# Patient Record
Sex: FEMALE | Race: WHITE | NOT HISPANIC OR LATINO | Employment: FULL TIME | URBAN - METROPOLITAN AREA
[De-identification: names, ages, dates, MRNs, and addresses within clinical notes are randomized per-mention and may not be internally consistent; named-entity substitution may affect disease eponyms.]

---

## 2017-01-09 ENCOUNTER — ALLSCRIPTS OFFICE VISIT (OUTPATIENT)
Dept: OTHER | Facility: OTHER | Age: 27
End: 2017-01-09

## 2017-02-12 ENCOUNTER — ALLSCRIPTS OFFICE VISIT (OUTPATIENT)
Dept: OTHER | Facility: OTHER | Age: 27
End: 2017-02-12

## 2017-04-04 ENCOUNTER — GENERIC CONVERSION - ENCOUNTER (OUTPATIENT)
Dept: OTHER | Facility: OTHER | Age: 27
End: 2017-04-04

## 2017-04-04 ENCOUNTER — TRANSCRIBE ORDERS (OUTPATIENT)
Dept: ADMINISTRATIVE | Facility: HOSPITAL | Age: 27
End: 2017-04-04

## 2017-04-04 ENCOUNTER — ALLSCRIPTS OFFICE VISIT (OUTPATIENT)
Dept: OTHER | Facility: OTHER | Age: 27
End: 2017-04-04

## 2017-04-04 DIAGNOSIS — R01.1 UNDIAGNOSED CARDIAC MURMURS: Primary | ICD-10-CM

## 2017-04-05 ENCOUNTER — HOSPITAL ENCOUNTER (OUTPATIENT)
Dept: NON INVASIVE DIAGNOSTICS | Facility: HOSPITAL | Age: 27
Discharge: HOME | End: 2017-04-05
Attending: FAMILY MEDICINE
Payer: COMMERCIAL

## 2017-04-05 DIAGNOSIS — R01.1 UNDIAGNOSED CARDIAC MURMURS: ICD-10-CM

## 2017-04-05 PROCEDURE — 93306 TTE W/DOPPLER COMPLETE: CPT

## 2017-04-06 ENCOUNTER — GENERIC CONVERSION - ENCOUNTER (OUTPATIENT)
Dept: OTHER | Facility: OTHER | Age: 27
End: 2017-04-06

## 2017-04-07 ENCOUNTER — GENERIC CONVERSION - ENCOUNTER (OUTPATIENT)
Dept: OTHER | Facility: OTHER | Age: 27
End: 2017-04-07

## 2017-04-07 LAB
ADEQUACY: (HISTORICAL): NORMAL
CLINICIAN PROVIDIED ICD 9 OR 10 (HISTORICAL): NORMAL
COMMENT (HISTORICAL): NORMAL
DIAGNOSIS (HISTORICAL): NORMAL
NOTE: (HISTORICAL): NORMAL
PERFORMED BY (HISTORICAL): NORMAL
REFLEX (HISTORICAL): NORMAL

## 2017-04-24 ENCOUNTER — ALLSCRIPTS OFFICE VISIT (OUTPATIENT)
Dept: OTHER | Facility: OTHER | Age: 27
End: 2017-04-24

## 2017-06-18 ENCOUNTER — ALLSCRIPTS OFFICE VISIT (OUTPATIENT)
Dept: OTHER | Facility: OTHER | Age: 27
End: 2017-06-18

## 2017-06-20 ENCOUNTER — APPOINTMENT (OUTPATIENT)
Dept: RADIOLOGY | Facility: CLINIC | Age: 27
End: 2017-06-20
Payer: COMMERCIAL

## 2017-06-20 ENCOUNTER — ALLSCRIPTS OFFICE VISIT (OUTPATIENT)
Dept: OTHER | Facility: OTHER | Age: 27
End: 2017-06-20

## 2017-06-20 ENCOUNTER — GENERIC CONVERSION - ENCOUNTER (OUTPATIENT)
Dept: OTHER | Facility: OTHER | Age: 27
End: 2017-06-20

## 2017-06-20 ENCOUNTER — TRANSCRIBE ORDERS (OUTPATIENT)
Dept: RADIOLOGY | Facility: CLINIC | Age: 27
End: 2017-06-20

## 2017-06-20 DIAGNOSIS — M25.552 PAIN IN LEFT HIP: ICD-10-CM

## 2017-06-20 PROCEDURE — 73502 X-RAY EXAM HIP UNI 2-3 VIEWS: CPT

## 2017-10-05 ENCOUNTER — ALLSCRIPTS OFFICE VISIT (OUTPATIENT)
Dept: OTHER | Facility: OTHER | Age: 27
End: 2017-10-05

## 2018-01-10 NOTE — RESULT NOTES
Verified Results  * XR HIP/PELV 2-3 VWS LEFT W PELVIS IF PERFORMED 20Jun2017 12:00PM Jesus Lawson Order Number: [id number]     Test Name Result Flag Reference   * XR HIP/PELV 2-3 VWS LEFT (Report)     LEFT HIP     INDICATION: Left hip pain  COMPARISON: None     VIEWS: AP pelvis and 2 coned down views     IMAGES: 3     FINDINGS:     There is no fracture or dislocation  No degenerative changes  No lytic or blastic lesions are seen  Soft tissues are unremarkable  IMPRESSION:     No acute osseous abnormality         Workstation performed: [id number]     Signed by:   Aleja Valenzuela MD   6/20/17

## 2018-01-10 NOTE — RESULT NOTES
Verified Results  ([facility] [facility]) Pap IG, rfx HPV ASCU [id number] 12:00AM [employer]     Test Name Result Flag Reference   DIAGNOSIS: Comment     NEGATIVE FOR INTRAEPITHELIAL LESION AND MALIGNANCY  THE CYTOLOGY PROCESSING WAS PERFORMED AT THE [facility] LOCATED AT  [facility], [address] [address]  Specimen adequacy: Comment     Satisfactory for evaluation  Endocervical and/or squamous metaplastic  cells (endocervical component) are present  Clinician provided ICD10: Comment     Z01 419   Performed by: Radha Manuel, Cytotechnologist (ASCP)     [facility] Note: Comment     The Pap smear is a screening test designed to aid in the detection of  premalignant and malignant conditions of the uterine cervix  It is not a  diagnostic procedure and should not be used as the sole means of detecting  cervical cancer  Both false-positive and false-negative reports do occur    Comment     The HPV DNA reflex criteria were not met with this specimen result  therefore, no HPV testing was performed         Discussion/Summary   Your PAP test came back normal  Wily Mata

## 2018-01-11 NOTE — MISCELLANEOUS
Message  Return to work or school:   [employer] is under my professional care  She was seen in my office on 10/05/2017        please excuse from work of 10/05 and 10/06        Signatures   Electronically signed by : CARIN Fenton ; Oct  5 2017 10:06AM EST                       (Author)

## 2018-01-12 VITALS
DIASTOLIC BLOOD PRESSURE: 80 MMHG | WEIGHT: 131 LBS | TEMPERATURE: 97.8 F | HEIGHT: 62 IN | HEART RATE: 84 BPM | RESPIRATION RATE: 16 BRPM | SYSTOLIC BLOOD PRESSURE: 120 MMHG | BODY MASS INDEX: 24.11 KG/M2

## 2018-01-12 VITALS
BODY MASS INDEX: 23.55 KG/M2 | HEART RATE: 84 BPM | HEIGHT: 62 IN | RESPIRATION RATE: 16 BRPM | WEIGHT: 128 LBS | TEMPERATURE: 98.4 F | DIASTOLIC BLOOD PRESSURE: 70 MMHG | OXYGEN SATURATION: 99 % | SYSTOLIC BLOOD PRESSURE: 122 MMHG

## 2018-01-13 VITALS
HEART RATE: 84 BPM | RESPIRATION RATE: 18 BRPM | DIASTOLIC BLOOD PRESSURE: 80 MMHG | HEIGHT: 62 IN | TEMPERATURE: 97.8 F | WEIGHT: 130.38 LBS | BODY MASS INDEX: 23.99 KG/M2 | SYSTOLIC BLOOD PRESSURE: 120 MMHG

## 2018-01-13 VITALS
HEART RATE: 95 BPM | HEIGHT: 62 IN | DIASTOLIC BLOOD PRESSURE: 66 MMHG | TEMPERATURE: 98.7 F | OXYGEN SATURATION: 98 % | RESPIRATION RATE: 18 BRPM | SYSTOLIC BLOOD PRESSURE: 118 MMHG | BODY MASS INDEX: 24.11 KG/M2 | WEIGHT: 131 LBS

## 2018-01-13 VITALS
RESPIRATION RATE: 18 BRPM | HEART RATE: 87 BPM | SYSTOLIC BLOOD PRESSURE: 122 MMHG | TEMPERATURE: 97.6 F | OXYGEN SATURATION: 99 % | DIASTOLIC BLOOD PRESSURE: 72 MMHG

## 2018-01-13 NOTE — RESULT NOTES
Verified Results  ECHO COMPLETE WITH CONTRAST IF INDICATED 2017 02:40PM Melissa Augustin     Test Name Result Flag Reference   ECHO COMPLETE WITH CONTRAST IF INDICATED (Report)     [address]   [address] [facility]   [address]   [phone]     Transthoracic Echocardiogram   2D, M-mode, Doppler, and Color Doppler     Study date: 2017     Patient: Rochelle Lee   MR number: [id number]   Account number: [de-identified]   : 1990   Age: 28 years   Gender: Female   Status: Routine   Location: Echo lab   Height: 62 in   Weight: 130 7 lb   BP: 120/ 80 mmHg     Indications: Murmur     Diagnoses: 785 2 - CARDIAC MURMURS NEC     Sonographer: Josemanuel Kapadia   Primary Physician: Yanna Liu MD   Referring Physician: Yanna Liu MD   Group: Adolfo Tabares   Interpreting Physician: Raven Dye DO     IMPRESSIONS:   The study was within normal limits  HISTORY: PRIOR HISTORY: Patient has no history of cardiovascular disease  PROCEDURE: The procedure was performed in the echo lab  This was a routine study  The transthoracic approach was used  The study included complete 2D imaging, M-mode, complete spectral Doppler, and color Doppler  The heart rate was 100   bpm, at the start of the study  Image quality was adequate  LEFT VENTRICLE: Size was normal  Systolic function was normal by visual assessment  Ejection fraction was estimated in the range of 55 % to 60 %  There were no regional wall motion abnormalities  Wall thickness was normal  No evidence of   apical thrombus  DOPPLER: Left ventricular diastolic function parameters were normal      RIGHT VENTRICLE: The size was normal  Systolic function was normal  Wall thickness was normal      LEFT ATRIUM: Size was normal      RIGHT ATRIUM: Size was normal      MITRAL VALVE: Valve structure was normal  There was normal leaflet separation   DOPPLER: The transmitral velocity was within the normal range  There was no evidence for stenosis  There was no significant regurgitation  AORTIC VALVE: The valve was trileaflet  Leaflets exhibited normal thickness and normal cuspal separation  DOPPLER: Transaortic velocity was within the normal range  There was no evidence for stenosis  There was no significant   regurgitation  TRICUSPID VALVE: The valve structure was normal  There was normal leaflet separation  DOPPLER: The transtricuspid velocity was within the normal range  There was no evidence for stenosis  There was no significant regurgitation  PULMONIC VALVE: Leaflets exhibited normal thickness, no calcification, and normal cuspal separation  DOPPLER: The transpulmonic velocity was within the normal range  There was no significant regurgitation  PERICARDIUM: There was no pericardial effusion  The pericardium was normal in appearance  AORTA: The root exhibited normal size  SYSTEMIC VEINS: IVC: The inferior vena cava was normal in size  SYSTEM MEASUREMENT TABLES     2D mode   AoR Diam 2D: 2 9 cm   LA Diam (2D): 2 9 cm   LA/Ao (2D): 1   FS (2D Teich): 27 %   IVSd (2D): 0 86 cm   LVDEV: 89 6 cmï¾³   LVESV: 42 2 cmï¾³   LVIDd(2D): 4 44 cm   LVISd (2D): 3 24 cm   LVPWd (2D): 0 96 cm   SV (Teich): 47 4 cmï¾³     Apical four chamber   LVEF A4C: 60 %     Unspecified Scan Mode   MV Peak A Hasmukh: 734 mm/s   MV Peak E Hasmukh   Mean: 929 mm/s   MVA (PHT): 4 68 cm squared   PHT: 47 ms   RA Area: 11 cm squared   RA Volume: 19 7 cmï¾³   TAPSE: 2 1 cm     [employer] Accredited Echocardiography Laboratory     Prepared and electronically signed by     Ana Luisa Priest DO   Signed 06-Apr-2017 08:55:53

## 2018-01-14 VITALS
RESPIRATION RATE: 18 BRPM | TEMPERATURE: 98.3 F | WEIGHT: 132 LBS | HEIGHT: 61 IN | HEART RATE: 85 BPM | OXYGEN SATURATION: 97 % | SYSTOLIC BLOOD PRESSURE: 131 MMHG | DIASTOLIC BLOOD PRESSURE: 83 MMHG | BODY MASS INDEX: 24.92 KG/M2

## 2018-02-12 ENCOUNTER — OFFICE VISIT (OUTPATIENT)
Dept: URGENT CARE | Facility: CLINIC | Age: 28
End: 2018-02-12
Payer: COMMERCIAL

## 2018-02-12 VITALS
DIASTOLIC BLOOD PRESSURE: 70 MMHG | OXYGEN SATURATION: 97 % | BODY MASS INDEX: 25.43 KG/M2 | WEIGHT: 138.2 LBS | HEART RATE: 93 BPM | HEIGHT: 62 IN | SYSTOLIC BLOOD PRESSURE: 100 MMHG | TEMPERATURE: 98.1 F

## 2018-02-12 DIAGNOSIS — J11.1 INFLUENZA: Primary | ICD-10-CM

## 2018-02-12 PROCEDURE — 99213 OFFICE O/P EST LOW 20 MIN: CPT | Performed by: PHYSICIAN ASSISTANT

## 2018-02-12 RX ORDER — NORGESTIMATE AND ETHINYL ESTRADIOL 0.25-0.035
KIT ORAL
COMMUNITY
End: 2019-01-23 | Stop reason: ALTCHOICE

## 2018-02-12 RX ORDER — NORGESTIMATE AND ETHINYL ESTRADIOL 0.25-0.035
1 KIT ORAL DAILY
COMMUNITY
End: 2019-01-23 | Stop reason: ALTCHOICE

## 2018-02-12 RX ORDER — OSELTAMIVIR PHOSPHATE 75 MG/1
75 CAPSULE ORAL 2 TIMES DAILY
Qty: 10 CAPSULE | Refills: 0 | Status: SHIPPED | OUTPATIENT
Start: 2018-02-12 | End: 2018-02-17

## 2018-02-12 RX ORDER — ALBUTEROL SULFATE 90 UG/1
AEROSOL, METERED RESPIRATORY (INHALATION)
COMMUNITY
Start: 2017-10-05

## 2018-02-12 RX ORDER — BENZONATATE 200 MG/1
1 CAPSULE ORAL 3 TIMES DAILY PRN
COMMUNITY
Start: 2017-10-05 | End: 2019-01-23 | Stop reason: ALTCHOICE

## 2018-02-12 NOTE — LETTER
February 12, 2018     Patient: Rosalinda Velasco   YOB: 1990   Date of Visit: 2/12/2018       To Whom It May Concern: It is my medical opinion that Barbara Carrasquillo may return to work on 2/14/2018 pending that her symptoms have improved  If you have any questions or concerns, please don't hesitate to call           Sincerely,        Xavi Guerrero PA-C    CC: Rosalinda Velasco

## 2018-02-12 NOTE — PATIENT INSTRUCTIONS
Influenza   AMBULATORY CARE:   Influenza  (the flu) is an infection caused by the influenza virus  The flu is easily spread when an infected person coughs, sneezes, or has close contact with others  You may be able to spread the flu to others for 1 week or longer after signs or symptoms appear  Common signs and symptoms include the following:   · Fever and chills    · Headaches, body aches, and muscle or joint pain    · Cough, runny nose, and sore throat    · Loss of appetite, nausea, vomiting, or diarrhea    · Tiredness    · Trouble breathing  Call 911 for any of the following:   · You have trouble breathing, and your lips look purple or blue  · You have a seizure  Seek care immediately if:   · You are dizzy, or you are urinating less or not at all  · You have a headache with a stiff neck, and you feel tired or confused  · You have new pain or pressure in your chest     · Your symptoms, such as shortness of breath, vomiting, or diarrhea, get worse  · Your symptoms, such as fever and coughing, seem to get better, but then get worse  Contact your healthcare provider if:   · You have new muscle pain or weakness  · You have questions or concerns about your condition or care  Treatment for influenza  may include any of the following:  · Acetaminophen  decreases pain and fever  It is available without a doctor's order  Ask how much to take and how often to take it  Follow directions  Acetaminophen can cause liver damage if not taken correctly  · NSAIDs , such as ibuprofen, help decrease swelling, pain, and fever  This medicine is available with or without a doctor's order  NSAIDs can cause stomach bleeding or kidney problems in certain people  If you take blood thinner medicine, always ask your healthcare provider if NSAIDs are safe for you  Always read the medicine label and follow directions  · Antivirals  help fight a viral infection    Manage your symptoms:   · Rest  as much as you can to help you recover  · Drink liquids as directed  to help prevent dehydration  Ask how much liquid to drink each day and which liquids are best for you  Prevent the spread of the flu:   · Wash your hands often  Use soap and water  Wash your hands after you use the bathroom, change a child's diapers, or sneeze  Wash your hands before you prepare or eat food  Use gel hand cleanser when soap and water are not available  Do not touch your eyes, nose, or mouth unless you have washed your hands first            · Cover your mouth when you sneeze or cough  Cough into a tissue or the bend of your arm  · Clean shared items with a germ-killing   Clean table surfaces, doorknobs, and light switches  Do not share towels, silverware, and dishes with people who are sick  Wash bed sheets, towels, silverware, and dishes with soap and water  · Wear a mask  over your mouth and nose if you are sick or are near anyone who is sick  · Stay away from others  if you are sick  · Influenza vaccine  helps prevent influenza (flu)  Everyone older than 6 months should get a yearly influenza vaccine  Get the vaccine as soon as it is available, usually in September or October each year  Follow up with your healthcare provider as directed:  Write down your questions so you remember to ask them during your visits  © 2017 [facility] Information is for End User's use only and may not be sold, redistributed or otherwise used for commercial purposes  All illustrations and images included in CareNotes® are the copyrighted property of A D A M , Inc  or [address]  The above information is an  only  It is not intended as medical advice for individual conditions or treatments  Talk to your doctor, nurse or pharmacist before following any medical regimen to see if it is safe and effective for you  Influenza:   -The patients symptoms and hx are consistent with influenza   Will begin treatment with Tamiflu 75mg taken as directed  Side effects discussed     -Stay very well hydrated and rest   -You can take tylenol or advil for fever or pain   -If your symptoms worsen go to the ED

## 2018-02-12 NOTE — PROGRESS NOTES
Assessment/Plan:    Influenza:   -The patients symptoms and hx are consistent with influenza  Will begin treatment with Tamiflu 75mg taken as directed  Side effects discussed  -Stay very well hydrated and rest   -You can take tylenol or advil for fever or pain   -If your symptoms worsen go to the ED    No problem-specific Assessment & Plan notes found for this encounter  There are no diagnoses linked to this encounter  Subjective:      Patient ID: Jamel Bourne is a 33 y o  female  The patient presents today for a two day hx of fever, chills, myalgias and nausea  The patient reports that two days ago her symptoms started acutely with a fever of 103 degrees that has progressed and worsened to chills, dizziness, anorexia  This morning she has experienced severe myalgias  Her co-worker was diagnosed with flu on Thursday  She did not have her flu vaccine  She notes that she has a PMH of exercise induced asthma but is not currently on medications  Flu Symptoms   Associated symptoms include chills, fatigue, a fever, headaches and nausea  Pertinent negatives include no chest pain, congestion, coughing, sore throat or vomiting  The following portions of the patient's history were reviewed and updated as appropriate: allergies, current medications, past family history, past medical history, past social history, past surgical history and problem list     Review of Systems   Constitutional: Positive for appetite change, chills, fatigue and fever  HENT: Positive for rhinorrhea  Negative for congestion, ear discharge, ear pain, sinus pain, sinus pressure, sneezing and sore throat  Respiratory: Negative for cough, shortness of breath and wheezing  Cardiovascular: Negative for chest pain and palpitations  Gastrointestinal: Positive for nausea  Negative for constipation, diarrhea and vomiting  Neurological: Positive for dizziness, light-headedness and headaches  Objective:    Vitals:    02/12/18 0837   BP: 100/70   Pulse: 93   Temp: 98 1 °F (36 7 °C)   SpO2: 97%        Physical Exam   Constitutional: She is oriented to person, place, and time  She appears well-developed and well-nourished  HENT:   Nose: Nose normal    Mouth/Throat: Oropharynx is clear and moist  No oropharyngeal exudate  Neck: Normal range of motion  Cardiovascular: Normal rate, regular rhythm and normal heart sounds  Exam reveals no gallop and no friction rub  No murmur heard  Pulmonary/Chest: Effort normal and breath sounds normal  No respiratory distress  She has no wheezes  She has no rales  Abdominal: Soft  Bowel sounds are normal  She exhibits no distension  There is no tenderness  There is no rebound and no guarding  Lymphadenopathy:     She has no cervical adenopathy  Neurological: She is alert and oriented to person, place, and time  Psychiatric: She has a normal mood and affect

## 2018-03-16 DIAGNOSIS — Z30.9 ENCOUNTER FOR CONTRACEPTIVE MANAGEMENT, UNSPECIFIED TYPE: Primary | ICD-10-CM

## 2018-03-16 RX ORDER — NORGESTIMATE AND ETHINYL ESTRADIOL 0.25-0.035
KIT ORAL
Qty: 84 TABLET | Refills: 3 | Status: SHIPPED | OUTPATIENT
Start: 2018-03-16 | End: 2019-03-10 | Stop reason: SDUPTHER

## 2018-03-17 DIAGNOSIS — Z30.9 ENCOUNTER FOR CONTRACEPTIVE MANAGEMENT, UNSPECIFIED TYPE: Primary | ICD-10-CM

## 2018-03-17 RX ORDER — NORGESTIMATE AND ETHINYL ESTRADIOL 0.25-0.035
KIT ORAL
Qty: 84 TABLET | Refills: 3 | Status: SHIPPED | OUTPATIENT
Start: 2018-03-17 | End: 2019-01-23 | Stop reason: ALTCHOICE

## 2019-01-22 ENCOUNTER — APPOINTMENT (OUTPATIENT)
Dept: RADIOLOGY | Facility: CLINIC | Age: 29
End: 2019-01-22
Payer: COMMERCIAL

## 2019-01-22 ENCOUNTER — OFFICE VISIT (OUTPATIENT)
Dept: URGENT CARE | Facility: CLINIC | Age: 29
End: 2019-01-22
Payer: COMMERCIAL

## 2019-01-22 VITALS
HEART RATE: 87 BPM | BODY MASS INDEX: 27.94 KG/M2 | SYSTOLIC BLOOD PRESSURE: 112 MMHG | TEMPERATURE: 98.3 F | HEIGHT: 61 IN | DIASTOLIC BLOOD PRESSURE: 70 MMHG | WEIGHT: 148 LBS | OXYGEN SATURATION: 100 % | RESPIRATION RATE: 16 BRPM

## 2019-01-22 DIAGNOSIS — M54.2 CERVICALGIA: ICD-10-CM

## 2019-01-22 DIAGNOSIS — M79.602 LEFT ARM PAIN: Primary | ICD-10-CM

## 2019-01-22 DIAGNOSIS — M79.602 LEFT ARM PAIN: ICD-10-CM

## 2019-01-22 PROCEDURE — 72050 X-RAY EXAM NECK SPINE 4/5VWS: CPT

## 2019-01-22 PROCEDURE — 73030 X-RAY EXAM OF SHOULDER: CPT

## 2019-01-22 PROCEDURE — 99214 OFFICE O/P EST MOD 30 MIN: CPT | Performed by: PHYSICIAN ASSISTANT

## 2019-01-22 RX ORDER — METHYLPREDNISOLONE 4 MG/1
TABLET ORAL
Qty: 1 EACH | Refills: 0 | Status: SHIPPED | OUTPATIENT
Start: 2019-01-22 | End: 2019-12-21 | Stop reason: ALTCHOICE

## 2019-01-22 NOTE — LETTER
January 22, 2019     Patient: Sulaiman Moseley   YOB: 1990   Date of Visit: 1/22/2019       To Whom It May Concern: It is my medical opinion that [address] may return to work on 1/24/2019  If you have any questions or concerns, please don't hesitate to call           Sincerely,        Brigitte Chilel PA-C    CC: Sulaiman Moseley

## 2019-01-22 NOTE — PROGRESS NOTES
[address] [facility]        NAME: Jared Moseley is a 26 y o  female  : 1990    MRN: [id number]  DATE: 2019  TIME: 3:37 PM    Assessment and Plan   Left arm pain [M79 602]  1  Left arm pain  XR spine cervical complete 4 or 5 vw non injury    XR shoulder 2+ vw left    methylPREDNISolone 4 MG tablet therapy pack   2  Cervicalgia           Patient Instructions   Cervicalgia:   -The patients cervical Xray showed straightening of the spine with left neural foraminal impingement of C3-C5  The patients symptoms are secondary to nerve impingement as she is experiencing paraesthesias  Will place the patient on a Medrol Dose Breonna  Take with meals    -Massage the neck and scapula/trap area, use warm compress and do light stretching  You can take Tylenol for the pain but avoid NSAIDS while on the steroid    -Follow up with Pernell Kayser is advised as the patient will likely need further management and work up    -If your symptoms become acutely worse or if you experience chest pains, shortness of breath, dizziness, palpitations go immediately to the ED    Follow up with PCP in 3-5 days  Proceed to  ER if symptoms worsen  Chief Complaint     Chief Complaint   Patient presents with    Pain     left shoulder/neck/ arm pain onset 1-2 weeks ago, unknown etiology;tingling of the left arm; dead weight sensation of the left arm; pain and discomfort with grasping of the left hand         History of Present Illness         The patient presents today for left upper extremity pain x 2 weeks  The patient reports that two weeks ago she began to experience left sided scapula/trapezius pain that started to radiate to the left paraspinal muscles and down her entire left arm  She states that she began experiencing weakness, numbness and tingling of the left upper extremity and began to have pain with grasping objects  She states that the pain changes from burning/stinging to a throbbing pain   She states that there are no relieving or exacerbating factors  She states that there was no trauma, injury or inciting event  She has not tried any OTC interventions  She denies redness, bruising or swelling of the left upper extremity  She denies arthralgias or joint swelling  She denies fever, chills, chest pains, dyspnea, cough, dizziness, palpitations  She denies surgery or injury to the cervical spine  Review of Systems   Review of Systems   Constitutional: Negative for activity change, appetite change, chills, diaphoresis, fatigue, fever and unexpected weight change  HENT: Negative  Respiratory: Negative for cough, choking, chest tightness, shortness of breath, wheezing and stridor  Cardiovascular: Negative for chest pain, palpitations and leg swelling  Gastrointestinal: Negative  Musculoskeletal: Positive for back pain and neck pain  Negative for arthralgias, gait problem, joint swelling, myalgias and neck stiffness  Skin: Negative for rash  Allergic/Immunologic: Negative for immunocompromised state  Neurological: Positive for weakness and numbness  Negative for dizziness, facial asymmetry, speech difficulty, light-headedness and headaches  Hematological: Negative for adenopathy  Psychiatric/Behavioral: Negative for confusion           Current Medications       Current Outpatient Prescriptions:     albuterol (VENTOLIN HFA) 90 mcg/act inhaler, Inhale, Disp: , Rfl:     norgestimate-ethinyl estradiol (ORTHO-CYCLEN) 0 25-35 MG-MCG per tablet, Take 1 tablet by mouth daily, Disp: , Rfl:     benzonatate (TESSALON) 200 MG capsule, Take 1 capsule by mouth 3 (three) times a day as needed, Disp: , Rfl:     methylPREDNISolone 4 MG tablet therapy pack, Use as directed on package, Disp: 1 each, Rfl: 0    MONO-LINYAH 0 25-35 MG-MCG per tablet, TAKE ONE TABLET BY MOUTH EVERY DAY (Patient not taking: Reported on 1/22/2019), Disp: 84 tablet, Rfl: 3    MONO-LINYAH 0 25-35 MG-MCG per tablet, TAKE ONE TABLET BY MOUTH EVERY DAY (Patient not taking: Reported on 1/22/2019), Disp: 84 tablet, Rfl: 3    norgestimate-ethinyl estradiol (ORTHO-CYCLEN, 28,) 0 25-35 MG-MCG per tablet, Take 1 tablet by mouth daily, Disp: , Rfl:     norgestimate-ethinyl estradiol ([facility] 28) 0 25-35 MG-MCG per tablet, Take by mouth, Disp: , Rfl:     Current Allergies     Allergies as of 01/22/2019 - Reviewed 01/22/2019   Allergen Reaction Noted    Bromelains  10/04/2014    Food Hives and Throat Swelling 11/29/2012    Pineapple flavor  10/04/2014            The following portions of the patient's history were reviewed and updated as appropriate: allergies, current medications, past family history, past medical history, past social history, past surgical history and problem list      Past Medical History:   Diagnosis Date    Asthma        Past Surgical History:   Procedure Laterality Date    MYRINGOTOMY W/ TUBES      ORTHOPEDIC SURGERY         Family History   Problem Relation Age of Onset    Cancer Mother     Arthritis Mother     COPD Father          Medications have been verified  Objective   /70   Pulse 87   Temp 98 3 °F (36 8 °C)   Resp 16   Ht 5' 1" (1 549 m)   Wt 67 1 kg (148 lb)   LMP 01/17/2019 (Exact Date)   SpO2 100%   Breastfeeding? No   BMI 27 96 kg/m²        Physical Exam     Physical Exam   Constitutional: She appears well-developed and well-nourished  No distress  Neck: Normal range of motion and full passive range of motion without pain  Neck supple  No spinous process tenderness and no muscular tenderness present  No neck rigidity  No edema, no erythema and normal range of motion present  Cardiovascular: Normal rate, regular rhythm, S1 normal, S2 normal, normal heart sounds and normal pulses  No extrasystoles are present  PMI is not displaced  Exam reveals no gallop, no S3, no S4, no distant heart sounds and no friction rub  No murmur heard  Pulmonary/Chest: No accessory muscle usage   No tachypnea and no bradypnea  No respiratory distress  She has no decreased breath sounds  She has no wheezes  She has no rhonchi  She has no rales  Musculoskeletal:        Cervical back: She exhibits tenderness (Tenderness over the distal cervical spine into the left trapezius muscle/scapula ) and pain  She exhibits normal range of motion, no bony tenderness, no swelling, no edema, no deformity, no laceration, no spasm and normal pulse  Left upper arm: Normal         Left forearm: Normal         Left hand: Normal    Neurological: She has normal strength and normal reflexes  No sensory deficit  She exhibits normal muscle tone  Strength is 5/5 of the upper extremities bilaterally  Sensation intact  No redness, swelling or tenderness of the cervical spine or left arm  DTR's +2 of the upper extremities and symmetrical   strength intact  Skin: Skin is warm, dry and intact  No bruising and no ecchymosis noted  She is not diaphoretic  No cyanosis or erythema  Nails show no clubbing  Nursing note and vitals reviewed

## 2019-01-23 ENCOUNTER — OFFICE VISIT (OUTPATIENT)
Dept: OBGYN CLINIC | Facility: CLINIC | Age: 29
End: 2019-01-23
Payer: COMMERCIAL

## 2019-01-23 VITALS
HEART RATE: 81 BPM | WEIGHT: 135 LBS | DIASTOLIC BLOOD PRESSURE: 79 MMHG | HEIGHT: 61 IN | BODY MASS INDEX: 25.49 KG/M2 | SYSTOLIC BLOOD PRESSURE: 114 MMHG

## 2019-01-23 DIAGNOSIS — S46.812A TRAPEZIUS STRAIN, LEFT, INITIAL ENCOUNTER: Primary | ICD-10-CM

## 2019-01-23 DIAGNOSIS — M25.512 ACUTE PAIN OF LEFT SHOULDER: ICD-10-CM

## 2019-01-23 PROCEDURE — 99203 OFFICE O/P NEW LOW 30 MIN: CPT | Performed by: ORTHOPAEDIC SURGERY

## 2019-01-23 NOTE — PROGRESS NOTES
Assessment/Plan:  1  Trapezius strain, left, initial encounter  Ambulatory referral to Physical Therapy   2  Acute pain of left shoulder  Ambulatory referral to Physical Therapy       María Reyez appears to have a strain of the neck and left trapezius region  She has some discomfort in the shoulder on examination without weakness  I would like for her to start physical therapy on both the shoulder and the neck  I will re-evaluate her symptoms in 6 weeks and if she has persistent discomfort and he did the neck of the shoulder we could consider MRI for further imaging  I advised her to allow the Medrol pack to finish in see how she feels if she has continued discomfort we could then start physical therapy  Subjective:   Gloria Moseley is a 26 y o  female who presents for evaluation for left-sided neck and shoulder discomfort  She denies any specific injury or trauma but developed discomfort in the left side of her neck into her shoulder and down her left arm about 1 week ago  She presented to urgent care 1 day ago where she had x-ray of her neck and shoulder which did not show significant abnormality  She did have loss of normal cervical lordosis in the neck  She did not have any history of neck discomfort or nerve issue in the left side  She has pain today that is aching in throbbing and can become sharp with certain movements  The pain is intermittent and does improve with heat and stretching  She states that she was started on a Medrol Dosepak which has helped her pain  She is having a little bit better symptoms today  She denies any numbness or weakness in her left upper extremity  Review of Systems   Constitutional: Negative for chills, fever and unexpected weight change  HENT: Negative for hearing loss, nosebleeds and sore throat  Eyes: Negative for pain, redness and visual disturbance  Respiratory: Negative for cough, shortness of breath and wheezing      Cardiovascular: Negative for chest pain, palpitations and leg swelling  Gastrointestinal: Negative for abdominal pain, nausea and vomiting  Endocrine: Negative for polydipsia and polyuria  Genitourinary: Negative for dysuria and hematuria  Musculoskeletal:        See HPI   Skin: Negative for rash and wound  Neurological: Negative for dizziness, numbness and headaches  Psychiatric/Behavioral: Negative for decreased concentration and suicidal ideas  The patient is not nervous/anxious  Past Medical History:   Diagnosis Date    Asthma        Past Surgical History:   Procedure Laterality Date    MYRINGOTOMY W/ TUBES      ORTHOPEDIC SURGERY         Family History   Problem Relation Age of Onset    Cancer Mother     Arthritis Mother     COPD Father        Social History     Occupational History    Not on file  Social History Main Topics    Smoking status: Former Smoker     Packs/day: 0 25     Years: 10 00     Quit date: 12/28/2017    Smokeless tobacco: Never Used    Alcohol use 0 6 oz/week     1 Glasses of wine per week    Drug use: No    Sexual activity: Not on file         Current Outpatient Prescriptions:     albuterol (VENTOLIN HFA) 90 mcg/act inhaler, Inhale, Disp: , Rfl:     methylPREDNISolone 4 MG tablet therapy pack, Use as directed on package, Disp: 1 each, Rfl: 0    MONO-LINYAH 0 25-35 MG-MCG per tablet, TAKE ONE TABLET BY MOUTH EVERY DAY, Disp: 84 tablet, Rfl: 3    Allergies   Allergen Reactions    Bromelains     Food Hives and Throat Swelling     Annotation - [id number]: Pineapple    Latex     Pineapple Flavor        Objective:  Vitals:    01/23/19 1121   BP: 114/79   Pulse: 81       Left Shoulder Exam     Tenderness   Left shoulder tenderness location: Mild posterior tenderness  Range of Motion   The patient has normal left shoulder ROM      Muscle Strength   Abduction: 5/5   Internal Rotation: 5/5   External Rotation: 5/5   Supraspinatus: 5/5   Subscapularis: 4/5   Biceps: 5/5     Tests   Drop Arm: negative  Hawkin's test: negative  Impingement: negative    Other   Erythema: absent  Sensation: normal  Pulse: present     Comments:  Positive Coos's test  Positive lift-off test  Negative belly press test            Physical Exam   Constitutional: She is oriented to person, place, and time  She appears well-developed and well-nourished  HENT:   Head: Normocephalic and atraumatic  Eyes: Pupils are equal, round, and reactive to light  Conjunctivae are normal    Neck: Normal range of motion  Neck supple  Muscular tenderness present  No spinous process tenderness present  No neck rigidity  Normal range of motion present  Negative Spurling's maneuver bilaterally    Full sensation and strength in bilateral upper extremities  5/5 with all upper extremity muscle testing compared to right side  Cardiovascular: Normal rate and intact distal pulses  Pulmonary/Chest: Effort normal  No respiratory distress  Musculoskeletal:   As noted in HPI   Neurological: She is alert and oriented to person, place, and time  Skin: Skin is warm and dry  Psychiatric: She has a normal mood and affect  Her behavior is normal    Vitals reviewed  I have personally reviewed pertinent films in PACS and my interpretation is as follows: Three-view x-rays of the left shoulder demonstrate no evidence of acute fracture or other significant abnormality    Five view x-rays of the cervical spine demonstrate no evidence of acute fracture  There is loss of normal cervical lordosis consistent with muscle spasm  No significant degenerative changes visible

## 2019-03-10 DIAGNOSIS — Z30.9 ENCOUNTER FOR CONTRACEPTIVE MANAGEMENT, UNSPECIFIED TYPE: ICD-10-CM

## 2019-03-12 RX ORDER — NORGESTIMATE AND ETHINYL ESTRADIOL 0.25-0.035
KIT ORAL
Qty: 28 TABLET | Refills: 1 | Status: SHIPPED | OUTPATIENT
Start: 2019-03-12 | End: 2019-12-21 | Stop reason: ALTCHOICE

## 2019-07-13 ENCOUNTER — OFFICE VISIT (OUTPATIENT)
Dept: URGENT CARE | Facility: CLINIC | Age: 29
End: 2019-07-13
Payer: COMMERCIAL

## 2019-07-13 VITALS
BODY MASS INDEX: 27.02 KG/M2 | RESPIRATION RATE: 16 BRPM | HEART RATE: 93 BPM | TEMPERATURE: 97.8 F | WEIGHT: 143 LBS | DIASTOLIC BLOOD PRESSURE: 87 MMHG | SYSTOLIC BLOOD PRESSURE: 145 MMHG | OXYGEN SATURATION: 99 %

## 2019-07-13 DIAGNOSIS — M54.40 ACUTE LEFT-SIDED LOW BACK PAIN WITH SCIATICA, SCIATICA LATERALITY UNSPECIFIED: Primary | ICD-10-CM

## 2019-07-13 LAB
SL AMB  POCT GLUCOSE, UA: ABNORMAL
SL AMB LEUKOCYTE ESTERASE,UA: ABNORMAL
SL AMB POCT BILIRUBIN,UA: ABNORMAL
SL AMB POCT BLOOD,UA: ABNORMAL
SL AMB POCT CLARITY,UA: ABNORMAL
SL AMB POCT COLOR,UA: ABNORMAL
SL AMB POCT KETONES,UA: ABNORMAL
SL AMB POCT NITRITE,UA: ABNORMAL
SL AMB POCT PH,UA: 6.5
SL AMB POCT SPECIFIC GRAVITY,UA: 1.01
SL AMB POCT URINE PROTEIN: ABNORMAL
SL AMB POCT UROBILINOGEN: 0.2

## 2019-07-13 PROCEDURE — 81002 URINALYSIS NONAUTO W/O SCOPE: CPT | Performed by: FAMILY MEDICINE

## 2019-07-13 PROCEDURE — 99213 OFFICE O/P EST LOW 20 MIN: CPT | Performed by: FAMILY MEDICINE

## 2019-07-13 NOTE — PROGRESS NOTES
[address] (?????) Now        NAME: Fe Moseley is a 26 y o  female  : 1990    MRN: [id number]  DATE: 2019  TIME: 9:53 AM    Assessment and Plan   Acute left-sided low back pain with sciatica, sciatica laterality unspecified [M54 40]  1  Acute left-sided low back pain with sciatica, sciatica laterality unspecified  POCT urine dip     Urine dip mostly unremarkable except for trace leukocyte esterase  Without symptoms of dysuria, frequency or urgency, the presence of a UTI is unlikely  Though negative for hematuria, there is a possibility of nephrolithiasis  Considered obtaining a KUB x-ray for possible calcium stone, but patient prefers to watch and wait for now  Advised to remain very well hydrated with water and to consider adding lemon juice as its properties decrease the risk of stone formation  Return precautions given  Patient Instructions     Follow up with PCP in 3-5 days  Proceed to  ER if symptoms worsen  Chief Complaint     Chief Complaint   Patient presents with    Back Pain     Started 3 days ago         History of Present Illness       79-year-old female with pertinent history of nephrolithiasis and pyelonephritis presents today with 3 days of cloudy urine and left flank pain  Describes the pain as intermittent, stabbing and radiating outwards  It can be up to an 8/10 in intensity  Is associated with a mild pelvic pain  Also reports having some lightheadedness  Reports that she drinks plenty of water on a regular basis  Despite this, her urine continues to appear concentrated and cloudy  Presents today due to concern for possible kidney infection  Denies any obvious fevers, chills, dysuria, urinary frequency, hematuria, urgency or nausea  Of note, her pyelonephritis required hospital admission with IV antibiotics  Review of Systems   Review of Systems   Constitutional: Negative for chills and fever  Gastrointestinal: Negative for nausea     Genitourinary: Positive for flank pain (left) and pelvic pain (mild)  Negative for dysuria, frequency, hematuria and urgency  Cloudiness in urine   Neurological: Positive for light-headedness  Current Medications       Current Outpatient Medications:     albuterol (VENTOLIN HFA) 90 mcg/act inhaler, Inhale, Disp: , Rfl:     methylPREDNISolone 4 MG tablet therapy pack, Use as directed on package (Patient not taking: Reported on 7/13/2019), Disp: 1 each, Rfl: 0    MONO-LINYAH 0 25-35 MG-MCG per tablet, TAKE ONE TABLET BY MOUTH EVERY DAY (Patient not taking: Reported on 7/13/2019), Disp: 28 tablet, Rfl: 1    Current Allergies     Allergies as of 07/13/2019 - Reviewed 07/13/2019   Allergen Reaction Noted    Bromelains  10/04/2014    Food Hives and Throat Swelling 11/29/2012    Latex  01/23/2019    Pineapple flavor  10/04/2014    Naproxen Rash 04/30/2008            The following portions of the patient's history were reviewed and updated as appropriate: allergies, current medications, past family history, past medical history, past social history, past surgical history and problem list      Past Medical History:   Diagnosis Date    Asthma        Past Surgical History:   Procedure Laterality Date    MYRINGOTOMY W/ TUBES      ORTHOPEDIC SURGERY         Family History   Problem Relation Age of Onset    Cancer Mother     Arthritis Mother     COPD Father          Medications have been verified  Objective   /87   Pulse 93   Temp 97 8 °F (36 6 °C)   Resp 16   Wt 64 9 kg (143 lb)   SpO2 99%   BMI 27 02 kg/m²        Physical Exam     Physical Exam   Constitutional: She is oriented to person, place, and time  She appears well-developed and well-nourished  No distress  HENT:   Head: Normocephalic and atraumatic  Eyes: Conjunctivae are normal    Cardiovascular: Normal rate and regular rhythm  Pulmonary/Chest: Effort normal and breath sounds normal  No stridor  No respiratory distress   She has no wheezes  She has no rales  Abdominal: Soft  She exhibits no distension  There is tenderness (Left CVA tenderness  Mild right pelvic tenderness  )  There is no rebound  Neurological: She is alert and oriented to person, place, and time  Skin: Skin is warm  She is not diaphoretic  Psychiatric: She has a normal mood and affect  Her behavior is normal  Judgment and thought content normal    Nursing note and vitals reviewed

## 2019-12-21 ENCOUNTER — CLINICAL SUPPORT (OUTPATIENT)
Dept: URGENT CARE | Facility: CLINIC | Age: 29
End: 2019-12-21
Payer: COMMERCIAL

## 2019-12-21 DIAGNOSIS — Z23 NEED FOR PROPHYLACTIC VACCINATION AND INOCULATION AGAINST INFLUENZA: Primary | ICD-10-CM

## 2019-12-21 PROCEDURE — 90686 IIV4 VACC NO PRSV 0.5 ML IM: CPT

## 2019-12-21 PROCEDURE — 90471 IMMUNIZATION ADMIN: CPT
